# Patient Record
Sex: MALE | Race: BLACK OR AFRICAN AMERICAN | NOT HISPANIC OR LATINO | URBAN - NONMETROPOLITAN AREA
[De-identification: names, ages, dates, MRNs, and addresses within clinical notes are randomized per-mention and may not be internally consistent; named-entity substitution may affect disease eponyms.]

---

## 2020-02-19 ENCOUNTER — IMPORTED ENCOUNTER (OUTPATIENT)
Dept: URBAN - NONMETROPOLITAN AREA CLINIC 1 | Facility: CLINIC | Age: 45
End: 2020-02-19

## 2020-02-19 PROBLEM — H00.14: Noted: 2020-02-19

## 2020-02-19 PROCEDURE — 99202 OFFICE O/P NEW SF 15 MIN: CPT

## 2020-02-19 PROCEDURE — 67800 REMOVE EYELID LESION: CPT

## 2020-02-19 NOTE — PATIENT DISCUSSION
Chalazion Left Upper Lid- Explained need for incision and drainage of chronic inflammation. Risks and benefits discussed and patient desires to proceed. - Recommend and discussed lid hygiene and the importance of warm compresses with [employer]um[employer] Baby Shampoo twice daily.- Instruction sheet given to patient today.

## 2022-04-15 ASSESSMENT — TONOMETRY
OD_IOP_MMHG: 18
OS_IOP_MMHG: 18

## 2022-04-15 ASSESSMENT — VISUAL ACUITY
OS_CC: 20/25+2
OD_CC: 20/30-1